# Patient Record
Sex: FEMALE | Race: WHITE | NOT HISPANIC OR LATINO | Employment: UNEMPLOYED | URBAN - METROPOLITAN AREA
[De-identification: names, ages, dates, MRNs, and addresses within clinical notes are randomized per-mention and may not be internally consistent; named-entity substitution may affect disease eponyms.]

---

## 2020-10-30 PROBLEM — M25.562 CHRONIC ARTHRALGIAS OF KNEES AND HIPS: Status: ACTIVE | Noted: 2020-10-30

## 2020-10-30 PROBLEM — G89.29 CHRONIC ARTHRALGIAS OF KNEES AND HIPS: Status: ACTIVE | Noted: 2020-10-30

## 2020-10-30 PROBLEM — M25.551 CHRONIC ARTHRALGIAS OF KNEES AND HIPS: Status: ACTIVE | Noted: 2020-10-30

## 2020-10-30 PROBLEM — M25.561 CHRONIC ARTHRALGIAS OF KNEES AND HIPS: Status: ACTIVE | Noted: 2020-10-30

## 2020-10-30 PROBLEM — M25.552 CHRONIC ARTHRALGIAS OF KNEES AND HIPS: Status: ACTIVE | Noted: 2020-10-30

## 2020-11-11 ENCOUNTER — TELEPHONE (OUTPATIENT)
Dept: PHARMACY | Facility: CLINIC | Age: 55
End: 2020-11-11

## 2020-11-11 NOTE — TELEPHONE ENCOUNTER
Hello,    The prior authorization for Celeste Smart's Celecoxib 50mg caps has been APPROVED through 11/11/2021. The medication will be mailed to patient's home from the [facility].    Please let me know if you have any questions.    Thanks,  Lachelle Devries, PharmD  [facility]  [phone]

## 2020-12-02 PROBLEM — Z78.9 POOR TOLERANCE FOR AMBULATION: Status: ACTIVE | Noted: 2020-12-02

## 2020-12-02 PROBLEM — G89.29 OTHER CHRONIC PAIN: Status: ACTIVE | Noted: 2020-12-02

## 2020-12-02 PROBLEM — M48.062 LUMBAR STENOSIS WITH NEUROGENIC CLAUDICATION: Chronic | Status: ACTIVE | Noted: 2020-12-02

## 2020-12-02 PROBLEM — M54.50 LOW BACK PAIN: Status: ACTIVE | Noted: 2020-12-02

## 2020-12-02 PROBLEM — M54.10 RADICULAR PAIN OF LEFT LOWER EXTREMITY: Status: ACTIVE | Noted: 2020-12-02

## 2020-12-02 PROBLEM — M48.062 LUMBAR STENOSIS WITH NEUROGENIC CLAUDICATION: Status: ACTIVE | Noted: 2020-12-02

## 2023-06-28 ENCOUNTER — TELEPHONE (OUTPATIENT)
Dept: PHARMACY | Facility: CLINIC | Age: 58
End: 2023-06-28

## 2023-06-28 NOTE — TELEPHONE ENCOUNTER
Estevan, this is Tamy Guillermo, clinical pharmacist with [facility] that is part of your care team.  We have begun working on your prescription that your doctor has sent us. Our next steps include:     Working with your insurance company to obtain approval for your medication  Working with you to ensure your medication is affordable     We will be calling you along the way with updates on your medication but if you have any concerns or receive information that you would like to discuss please reach us at [phone].    Welcome call outcome: Left voicemail.

## 2023-06-29 ENCOUNTER — SPECIALTY PHARMACY (OUTPATIENT)
Dept: PHARMACY | Facility: CLINIC | Age: 58
End: 2023-06-29

## 2023-06-30 ENCOUNTER — SPECIALTY PHARMACY (OUTPATIENT)
Dept: PHARMACY | Facility: CLINIC | Age: 58
End: 2023-06-30

## 2023-06-30 RX ORDER — QUETIAPINE FUMARATE 25 MG/1
25 TABLET, FILM COATED ORAL NIGHTLY
COMMUNITY
Start: 2023-05-03

## 2023-06-30 NOTE — TELEPHONE ENCOUNTER
Specialty Pharmacy - Initial Clinical Assessment    Specialty Medication Orders Linked to Encounter      Flowsheet Row Most Recent Value   Medication #1 adalimumab (HUMIRA,CF, PEN [id number] HS) 80 mg/0.8 mL-40 mg/0.4 mL PnKt (Order#[id number], Rx#[id number])   Medication #2 adalimumab (HUMIRA,CF, PEN) 40 mg/0.4 mL PnKt (Order#[id number], Rx#[id number])          Patient Diagnosis   M25.551, G89.29, M25.561, M25.552, M25.562 - Chronic arthralgias of knees and hips    Subjective    Celeste Smart is a 57 y.o. female, who is followed by the specialty pharmacy service for management and education.    Recent Encounters       Date Type Provider Description    06/30/2023 Specialty Pharmacy Tamy Guillermo, Jazmine Initial Clinical Assessment    06/29/2023 Specialty Pharmacy Tamy Guillermo, Jazmine Referral Authorization            Current Outpatient Medications   Medication Sig Note    epinephrine (EPIPEN 2-RACHEAL INJ) Inject as directed as needed.     adalimumab (HUMIRA,CF, PEN [id number]-LMPQ HS) 80 mg/0.8 mL-40 mg/0.4 mL PnKt Inject 0.8 mLs (80 mg total) into the skin on day 1, then inject 0.4 mLs (40 mg total) on day 8. Then inject 0.4 mLs (40 mg total) on day 22.     adalimumab (HUMIRA,CF, PEN) 40 mg/0.4 mL PnKt Inject 0.4 mLs (40 mg total) into the skin every 14 (fourteen) days.     busPIRone (BUSPAR) 10 MG tablet      celecoxib (CELEBREX) 50 MG capsule Take 2 capsules (100 mg total) by mouth once daily.     cyclobenzaprine (FLEXERIL) 10 MG tablet      DULoxetine (CYMBALTA) 60 MG capsule Take 60 mg by mouth.     folic acid (FOLVITE) 1 MG tablet Take 1 tablet (1 mg total) by mouth once daily.     gabapentin (NEURONTIN) 300 MG capsule Take 1 capsule (300 mg total) by mouth 3 (three) times daily.     meloxicam (MOBIC) 15 MG tablet Take 15 mg by mouth. 6/30/2023: Only taken for super bad flares and at night, PRN     meloxicam (MOBIC) 7.5 MG tablet Take 1 tablet (7.5 mg total) by mouth once daily.     methotrexate 2.5 MG Tab  Take 6 tablets (15 mg total) by mouth every 7 days.     metoprolol succinate (TOPROL-XL) 100 MG 24 hr tablet      pantoprazole (PROTONIX) 40 MG tablet      QUEtiapine (SEROQUEL) 25 MG Tab Take 25 mg by mouth every evening.    Last reviewed on 6/30/2023  9:35 AM by Tamy Guillermo, PharmD    Review of patient's allergies indicates:   Allergen Reactions    Cinnamon     Codeine     Sulfa (sulfonamide antibiotics)    Last reviewed on  6/30/2023 9:17 AM by Tamy Guillermo    Drug Interactions    Drug interactions evaluated: yes  Clinically relevant drug interactions identified: no  Provided the patient with educational material regarding drug interactions: not applicable         Adverse Effects    Arthralgias: Pos  Gait problem: Pos  Joint swelling: Pos       Assessment Questions - Documented Responses      Flowsheet Row Most Recent Value   Assessment    Medication Reconciliation completed for patient Yes   During the past 4 weeks, has patient missed any activities due to condition or medication? Missed Planned Activities   During the past 4 weeks, did patient have any of the following urgent care visits? None   Goals of Therapy Status Discussed (new start)   Status of the patients ability to self-administer: Is Able   All education points have been covered with patient? Yes, supplemental printed education provided   Welcome packet contents reviewed and discussed with patient? Yes   Assesment completed? Yes   Plan Therapy being initiated   Do you need to open a clinical intervention (i-vent)? No   Do you want to schedule first shipment? Yes          Refill Questions - Documented Responses      Flowsheet Row Most Recent Value   Refill Screening Questions    When does the patient need to receive the medication? 07/06/23   Refill Delivery Questions    How will the patient receive the medication? Mail   When does the patient need to receive the medication? 07/06/23   Shipping Address Home   Address in [facility]  "confirmed and updated if neccessary? Yes   Expected Copay ($) 3   Is the patient able to afford the medication copay? Yes   Payment Method new CC added to file   Days supply of Refill 28   Supplies needed? No supplies needed   Refill activity completed? Yes   Refill activity plan Refill scheduled   Shipment/Pickup Date: 07/05/23            Objective    She has a past medical history of Anemia, Degenerative lumbar disc, Depression, Fibromyalgia, Hypertension, Migraines, Restless leg, Salivary stone, and Skin cancer of nose.    Tried/failed medications: MTX. Meloxicam, prednisone, gabapentin, Cymbalta, Celebrex    BP Readings from Last 4 Encounters:   06/20/23 139/77   04/11/23 (!) 162/93   04/19/21 121/80   01/12/21 (!) 154/94     Ht Readings from Last 4 Encounters:   06/20/23 5' 3" (1.6 m)   04/11/23 5' 3" (1.6 m)   04/19/21 5' 3" (1.6 m)   01/12/21 5' 3" (1.6 m)     Wt Readings from Last 4 Encounters:   06/20/23 85.2 kg (187 lb 12.8 oz)   04/11/23 83.9 kg (184 lb 14.4 oz)   04/19/21 81.6 kg (180 lb)   01/12/21 81.6 kg (180 lb)       The goals of prescribed drug therapy management include:  Supporting patient to meet the prescriber's medical treatment objectives  Improving or maintaining quality of life  Maintaining optimal therapy adherence  Minimizing and managing side effects      Goals of Therapy Status: Discussed (new start)    Assessment/Plan  Patient plans to start therapy on 07/06/23      Indication, dosage, appropriateness, effectiveness, safety and convenience of her specialty medication(s) were reviewed today.     Patient Education   Patient received education on the following:   Expectations and possible outcomes of therapy  Proper use, timely administration, and missed dose management  Duration of therapy  Side effects, including prevention, minimization, and management  Contraindications and safety precautions  New or changed medications, including prescribe and over the counter medications and " supplements  Reviews recommended vaccinations, as appropriate  Storage, safe handling, and disposal    Patient educated on all topics and expressed understanding and no further questions.     Tasks added this encounter   4/1/2024 - Clinical Assessment (1 year recurrence)   Tasks due within next 3 months   No tasks due.     Tamy Guillermo, PharmD  [facility]  [address] [facility] [address]  Phone: [phone]  Fax: [phone]

## 2023-07-05 ENCOUNTER — TELEPHONE (OUTPATIENT)
Dept: PHARMACY | Facility: CLINIC | Age: 58
End: 2023-07-05

## 2023-07-05 NOTE — TELEPHONE ENCOUNTER
Outgoing call returning patient's call. Discussed medication w/ patient and - education provided on side effects, goals of therapy, and informed of welcome packet w/ 855 clinical pharmacist line.   Patient's  voiced understanding.

## 2023-07-27 ENCOUNTER — SPECIALTY PHARMACY (OUTPATIENT)
Dept: PHARMACY | Facility: CLINIC | Age: 58
End: 2023-07-27

## 2023-07-27 NOTE — TELEPHONE ENCOUNTER
Incoming call from  pt returning [facility] call, pt stated she took Day 22 dose yesterday (7/26). Pt's maintenance dose will not be due until 8/9, okay for a callback on 8/2 to set up.

## 2023-08-02 NOTE — TELEPHONE ENCOUNTER
Specialty Pharmacy - Refill Coordination    Specialty Medication Orders Linked to Encounter      Flowsheet Row Most Recent Value   Medication #1 adalimumab (HUMIRA,CF, PEN) 40 mg/0.4 mL PnKt (Order#[id number], Rx#[id number])            Refill Questions - Documented Responses      Flowsheet Row Most Recent Value   Refill Screening Questions    Changes to allergies? No   Changes to medications? No   New conditions since last clinic visit? No   Unplanned office visit, urgent care, ED, or hospital admission in the last 4 weeks? No   How does patient/caregiver feel medication is working? Too soon to tell   Financial problems or insurance changes? No   How many doses of your specialty medications were missed in the last 4 weeks? 0   Would patient like to speak to a pharmacist? No   When does the patient need to receive the medication? 08/09/23   Refill Delivery Questions    How will the patient receive the medication? Mail   When does the patient need to receive the medication? 08/09/23   Shipping Address Home   Address in [facility] confirmed and updated if neccessary? Yes   Expected Copay ($) 3   Is the patient able to afford the medication copay? Yes   Payment Method CC on file   Days supply of Refill 28   Supplies needed? No supplies needed   Refill activity completed? Yes   Refill activity plan Refill scheduled   Shipment/Pickup Date: 08/03/23            Current Outpatient Medications   Medication Sig    adalimumab (HUMIRA,CF, PEN [id number] HS) 80 mg/0.8 mL-40 mg/0.4 mL PnKt Inject 0.8 mLs (80 mg total) into the skin on day 1, then inject 0.4 mLs (40 mg total) on day 8. Then inject 0.4 mLs (40 mg total) on day 22.    adalimumab (HUMIRA,CF, PEN) 40 mg/0.4 mL PnKt Inject 0.4 mLs (40 mg total) into the skin every 14 (fourteen) days.    busPIRone (BUSPAR) 10 MG tablet     celecoxib (CELEBREX) 50 MG capsule Take 2 capsules (100 mg total) by mouth once daily.    cyclobenzaprine (FLEXERIL) 10 MG tablet      DULoxetine (CYMBALTA) 60 MG capsule Take 60 mg by mouth.    epinephrine (EPIPEN 2-RACHEAL INJ) Inject as directed as needed.    folic acid (FOLVITE) 1 MG tablet Take 1 tablet (1 mg total) by mouth once daily.    gabapentin (NEURONTIN) 300 MG capsule Take 1 capsule (300 mg total) by mouth 3 (three) times daily.    meloxicam (MOBIC) 15 MG tablet Take 15 mg by mouth.    meloxicam (MOBIC) 7.5 MG tablet Take 1 tablet (7.5 mg total) by mouth once daily.    methotrexate 2.5 MG Tab Take 6 tablets (15 mg total) by mouth every 7 days.    metoprolol succinate (TOPROL-XL) 100 MG 24 hr tablet     pantoprazole (PROTONIX) 40 MG tablet     QUEtiapine (SEROQUEL) 25 MG Tab Take 25 mg by mouth every evening.   Last reviewed on 6/30/2023  9:35 AM by Tamy Guillermo, Jazmine    Review of patient's allergies indicates:   Allergen Reactions    Cinnamon     Codeine     Sulfa (sulfonamide antibiotics)     Last reviewed on  6/30/2023 9:17 AM by Tamy Guillermo      Tasks added this encounter   No tasks added.   Tasks due within next 3 months   8/2/2023 - Refill Coordination Outreach (1 time occurrence)     Milli Merlos, PharmD  [facility]  [address] [place] [id number]  Phone: [phone]  Fax: [phone]

## 2023-09-09 ENCOUNTER — PATIENT MESSAGE (OUTPATIENT)
Dept: ADMINISTRATIVE | Facility: OTHER | Age: 58
End: 2023-09-09

## 2024-04-11 PROBLEM — L40.50 PSORIATIC ARTHRITIS: Status: ACTIVE | Noted: 2023-04-11

## 2024-05-17 ENCOUNTER — HOSPITAL ENCOUNTER (OUTPATIENT)
Dept: RADIOLOGY | Facility: HOSPITAL | Age: 59
Discharge: HOME | End: 2024-05-17
Attending: NEUROLOGICAL SURGERY
Payer: MEDICAID

## 2024-05-17 DIAGNOSIS — G89.18 POSTOPERATIVE PAIN AFTER SPINAL SURGERY: ICD-10-CM

## 2024-05-17 DIAGNOSIS — M48.062 LUMBAR STENOSIS WITH NEUROGENIC CLAUDICATION: ICD-10-CM

## 2024-05-17 PROCEDURE — 72100 X-RAY EXAM L-S SPINE 2/3 VWS: CPT | Mod: TC

## 2024-05-17 PROCEDURE — 72100 X-RAY EXAM L-S SPINE 2/3 VWS: CPT | Mod: 26,,, | Performed by: RADIOLOGY

## 2024-11-06 ENCOUNTER — PATIENT MESSAGE (OUTPATIENT)
Dept: ADMINISTRATIVE | Facility: OTHER | Age: 59
End: 2024-11-06
Payer: MEDICAID